# Patient Record
Sex: FEMALE | Race: WHITE | Employment: FULL TIME | URBAN - METROPOLITAN AREA
[De-identification: names, ages, dates, MRNs, and addresses within clinical notes are randomized per-mention and may not be internally consistent; named-entity substitution may affect disease eponyms.]

---

## 2017-11-22 PROBLEM — F98.8 ATTENTION DEFICIT DISORDER (ADD) WITHOUT HYPERACTIVITY: Status: ACTIVE | Noted: 2017-11-22

## 2017-11-22 PROBLEM — E55.9 VITAMIN D DEFICIENCY: Status: ACTIVE | Noted: 2017-11-22

## 2019-07-29 PROBLEM — F41.9 ANXIETY: Status: ACTIVE | Noted: 2019-07-29

## 2021-06-10 PROBLEM — Z82.49 FAMILY HISTORY OF PREMATURE CORONARY HEART DISEASE: Status: ACTIVE | Noted: 2021-06-10

## 2024-02-17 LAB — HIV RESULT OB: NEGATIVE

## 2024-04-10 ENCOUNTER — APPOINTMENT (OUTPATIENT)
Dept: OBGYN CLINIC | Facility: HOSPITAL | Age: 29
End: 2024-04-10
Payer: COMMERCIAL

## 2024-04-10 ENCOUNTER — HOSPITAL ENCOUNTER (OUTPATIENT)
Facility: HOSPITAL | Age: 29
Discharge: HOME | End: 2024-04-10
Attending: OBSTETRICS & GYNECOLOGY | Admitting: OBSTETRICS & GYNECOLOGY
Payer: COMMERCIAL

## 2024-04-10 VITALS
HEART RATE: 80 BPM | WEIGHT: 147 LBS | HEIGHT: 63.5 IN | DIASTOLIC BLOOD PRESSURE: 69 MMHG | SYSTOLIC BLOOD PRESSURE: 114 MMHG | BODY MASS INDEX: 25.72 KG/M2

## 2024-04-10 LAB
ANTIBODY SCREEN: NEGATIVE
BASOPHILS # BLD AUTO: 0.04 X10(3) UL (ref 0–0.2)
BASOPHILS NFR BLD AUTO: 0.4 %
BILIRUBIN URINE: NEGATIVE
BLOOD URINE: NEGATIVE
CONTROL RUN WITHIN 24 HOURS?: YES
EOSINOPHIL # BLD AUTO: 0.04 X10(3) UL (ref 0–0.7)
EOSINOPHIL NFR BLD AUTO: 0.4 %
ERYTHROCYTE [DISTWIDTH] IN BLOOD BY AUTOMATED COUNT: 12.1 %
GLUCOSE URINE: NEGATIVE
HCT VFR BLD AUTO: 39.8 %
HGB BLD-MCNC: 14 G/DL
IMM GRANULOCYTES # BLD AUTO: 0.03 X10(3) UL (ref 0–1)
IMM GRANULOCYTES NFR BLD: 0.3 %
LEUKOCYTE ESTERASE URINE: NEGATIVE
LYMPHOCYTES # BLD AUTO: 1.83 X10(3) UL (ref 1–4)
LYMPHOCYTES NFR BLD AUTO: 17.3 %
Lab: 40
MCH RBC QN AUTO: 32.6 PG (ref 26–34)
MCHC RBC AUTO-ENTMCNC: 35.2 G/DL (ref 31–37)
MCV RBC AUTO: 92.8 FL
MONOCYTES # BLD AUTO: 0.64 X10(3) UL (ref 0.1–1)
MONOCYTES NFR BLD AUTO: 6 %
NEUTROPHILS # BLD AUTO: 8.01 X10 (3) UL (ref 1.5–7.7)
NEUTROPHILS # BLD AUTO: 8.01 X10(3) UL (ref 1.5–7.7)
NEUTROPHILS NFR BLD AUTO: 75.6 %
NITRITE URINE: NEGATIVE
PH URINE: 6.5 (ref 5–8)
PLATELET # BLD AUTO: 197 10(3)UL (ref 150–450)
PROTEIN URINE: 30
RBC # BLD AUTO: 4.29 X10(6)UL
RH BLOOD TYPE: POSITIVE
SPEC GRAVITY: 1.02 (ref 1–1.03)
URINE CLARITY: CLEAR
URINE COLOR: YELLOW
UROBILINOGEN URINE: 0.2
WBC # BLD AUTO: 10.6 X10(3) UL (ref 4–11)

## 2024-04-10 PROCEDURE — 36415 COLL VENOUS BLD VENIPUNCTURE: CPT

## 2024-04-10 PROCEDURE — 86900 BLOOD TYPING SEROLOGIC ABO: CPT | Performed by: OBSTETRICS & GYNECOLOGY

## 2024-04-10 PROCEDURE — 86850 RBC ANTIBODY SCREEN: CPT | Performed by: OBSTETRICS & GYNECOLOGY

## 2024-04-10 PROCEDURE — 96372 THER/PROPH/DIAG INJ SC/IM: CPT

## 2024-04-10 PROCEDURE — 59025 FETAL NON-STRESS TEST: CPT

## 2024-04-10 PROCEDURE — 81002 URINALYSIS NONAUTO W/O SCOPE: CPT

## 2024-04-10 PROCEDURE — 59412 ANTEPARTUM MANIPULATION: CPT

## 2024-04-10 PROCEDURE — 86901 BLOOD TYPING SEROLOGIC RH(D): CPT | Performed by: OBSTETRICS & GYNECOLOGY

## 2024-04-10 PROCEDURE — 85025 COMPLETE CBC W/AUTO DIFF WBC: CPT | Performed by: OBSTETRICS & GYNECOLOGY

## 2024-04-10 RX ORDER — CHOLECALCIFEROL (VITAMIN D3) 25 MCG
1 TABLET,CHEWABLE ORAL DAILY
COMMUNITY

## 2024-04-10 RX ORDER — SODIUM CHLORIDE, SODIUM LACTATE, POTASSIUM CHLORIDE, CALCIUM CHLORIDE 600; 310; 30; 20 MG/100ML; MG/100ML; MG/100ML; MG/100ML
INJECTION, SOLUTION INTRAVENOUS CONTINUOUS
Status: DISCONTINUED | OUTPATIENT
Start: 2024-04-10 | End: 2024-04-10

## 2024-04-10 RX ORDER — TERBUTALINE SULFATE 1 MG/ML
0.25 INJECTION, SOLUTION SUBCUTANEOUS ONCE
Status: COMPLETED | OUTPATIENT
Start: 2024-04-10 | End: 2024-04-10

## 2024-04-10 NOTE — PROGRESS NOTES
Patient is a  @38.0 weeks who presents for a scheduled ECV.      Patient taken to room 104.  IV, labs, consent obtained.  POC discussed.  All patient questions answered.

## 2024-04-10 NOTE — PROCEDURES
A nonstress test was performed and found to be reactive. An ultrasound revealed that the baby was flower breech with the head in the maternal right upper quadrant. Informed consent was obtained. 0.25mg terbutaline was given subcutaneously. Gel was applied to her abdomen, and gentle pressure was applied to attempt to lift the breech out of the pelvis. The head was then gently maneuvered into the pelvis in a forward roll. Fetal heart tones were intermittently viewed during the procedure and were normal. The breech could not be elevated out of the pelvis despite multiple attempts. The procedure was deemed unsuccessful. She tolerated the procedure well. A nonstress test was again performed and was found to be reactive and reassuring for one hour. An abdominal binder was applied. There were no complications.

## 2024-04-10 NOTE — PROGRESS NOTES
Patient discharged in stable condition with printed discharge instructions in hand.  Instructions reviewed, all patient questions answered at this time.

## 2024-04-10 NOTE — DISCHARGE INSTRUCTIONS
section scheduled for 09:30am on .  Please plan to arrive at 07:30.  Nothing to eat or drink after midnight.

## 2024-04-10 NOTE — H&P
[facility]  History & Physical    Eda Armabula Patient Status:  Outpatient    1995 MRN [id number]   Location [facility] LABOR & DELIVERY Attending Ashley Valdez MD   Hosp Day # 0 PCP Solomon Anaya MD     SUBJECTIVE:    Eda Arambula is a 28 year old G at 38+0 weeks here for breech presentation for external cephalic version. Good FM, no VB, no LOF, feeling ctx.    Prenatal issues  1) ADHD no meds  2) HSV  3) Breech     Obstetric History:   OB History    Para Term  AB Living   2       1 0   SAB IAB Ectopic Multiple Live Births     1            # Outcome Date GA Lbr Jayson/2nd Weight Sex Type Anes PTL Lv   2 Current            1 IAB              Past Medical History:   Past Medical History:    ADHD (attention deficit hyperactivity disorder)    Herpes     Past Social History: History reviewed. No pertinent surgical history.  Family History:   Family History   Problem Relation Age of Onset    Heart Disease Father         acute MI    Heart Disease Paternal Grandmother     Heart Disease Paternal Grandfather     Heart Disorder Brother         acute MI    Obesity Brother         morbid obesity - 439#     Social History:   Social History     Tobacco Use    Smoking status: Never    Smokeless tobacco: Never   Substance Use Topics    Alcohol use: Yes     Comment: wine or beer weekends       Home Meds:   Medications Prior to Admission   Medication Sig Dispense Refill Last Dose    prenatal vitamin with DHA 27-0.8-228 MG Oral Cap Take 1 capsule by mouth daily.   2024    valACYclovir HCl 500 MG Oral Tab Take 1 tablet (500 mg total) by mouth as needed.   2024    BERNICE 0.25-35 MG-MCG Oral Tab TAKE 1 TABLET BY MOUTH EVERY DAY 84 tablet 0 Unknown    methylphenidate (METHYLIN) 10 MG Oral Tab Take 1 tablet (10 mg total) by mouth 2 (two) times daily. 60 tablet 0     Fluticasone Propionate 50 MCG/ACT Nasal Suspension 1 spray by Nasal route as needed. (Patient not taking: Reported on  6/10/2021)   Unknown     Allergies: No Known Allergies    OBJECTIVE:         Lungs:    Unlabored breathing, no wheezing   Heart:   regular rate and rhythm   Abdomen: Soft NT ND   Fetal Surveillance:  140 BPM   Mod paul, +accels, no decels      Cervix: no lesions or cervical motion tenderness and not digitally examined     Lab Review:  O, Rh+, Rubella-immune, Hepatitis B surface antigen non-reactive     ASSESSMENT:   at 38+0 weeks here for breech presentation for external cephalic version.    PLAN:  1) FHT reassuring  2) ECV: -It was reviewed with her that breech vaginal delivery is not recommended due to unacceptably high risk of fetal complications. The procedure for external cephalic version was reviewed. The success rate of about 50% was reviewed. The common complication of nonreactive fetal heart tones immediately post-procedure was reviewed. It was explained that serious complications like labor, placental abruption, SROM, and cord accident likely occur <6% of the time. Her questions were answered    Risks, benefits, alternatives and possible complications have been discussed in detail with the patient.  Pre-admission, admission, and post admission procedures and expectations were discussed in detail.  All questions answered, all appropriate consents will be signed at the Hospital. Admission is planned for today.   Continue present management..    Ashley Valdez MD  4/10/2024  11:35 AM

## 2024-04-11 ENCOUNTER — TELEPHONE (OUTPATIENT)
Dept: OBGYN UNIT | Facility: HOSPITAL | Age: 29
End: 2024-04-11

## 2024-04-17 ENCOUNTER — HOSPITAL ENCOUNTER (INPATIENT)
Facility: HOSPITAL | Age: 29
LOS: 3 days | Discharge: HOME | End: 2024-04-20
Attending: OBSTETRICS & GYNECOLOGY | Admitting: OBSTETRICS & GYNECOLOGY
Payer: COMMERCIAL

## 2024-04-17 ENCOUNTER — ANESTHESIA EVENT (OUTPATIENT)
Dept: OBGYN UNIT | Facility: HOSPITAL | Age: 29
End: 2024-04-17
Payer: COMMERCIAL

## 2024-04-17 ENCOUNTER — ANESTHESIA (OUTPATIENT)
Dept: OBGYN UNIT | Facility: HOSPITAL | Age: 29
End: 2024-04-17
Payer: COMMERCIAL

## 2024-04-17 DIAGNOSIS — Z98.891 H/O CESAREAN SECTION: Primary | ICD-10-CM

## 2024-04-17 PROBLEM — Z34.90 PREGNANCY (HCC): Status: ACTIVE | Noted: 2024-04-17

## 2024-04-17 LAB
ANTIBODY SCREEN: NEGATIVE
BASOPHILS # BLD AUTO: 0.06 X10(3) UL (ref 0–0.2)
BASOPHILS NFR BLD AUTO: 0.7 %
EOSINOPHIL # BLD AUTO: 0.07 X10(3) UL (ref 0–0.7)
EOSINOPHIL NFR BLD AUTO: 0.8 %
ERYTHROCYTE [DISTWIDTH] IN BLOOD BY AUTOMATED COUNT: 12.2 %
HCT VFR BLD AUTO: 39.5 %
HGB BLD-MCNC: 14.3 G/DL
IMM GRANULOCYTES # BLD AUTO: 0.08 X10(3) UL (ref 0–1)
IMM GRANULOCYTES NFR BLD: 0.9 %
LYMPHOCYTES # BLD AUTO: 1.65 X10(3) UL (ref 1–4)
LYMPHOCYTES NFR BLD AUTO: 19 %
MCH RBC QN AUTO: 33.3 PG (ref 26–34)
MCHC RBC AUTO-ENTMCNC: 36.2 G/DL (ref 31–37)
MCV RBC AUTO: 91.9 FL
MONOCYTES # BLD AUTO: 0.5 X10(3) UL (ref 0.1–1)
MONOCYTES NFR BLD AUTO: 5.8 %
NEUTROPHILS # BLD AUTO: 6.31 X10 (3) UL (ref 1.5–7.7)
NEUTROPHILS # BLD AUTO: 6.31 X10(3) UL (ref 1.5–7.7)
NEUTROPHILS NFR BLD AUTO: 72.8 %
PLATELET # BLD AUTO: 176 10(3)UL (ref 150–450)
RBC # BLD AUTO: 4.3 X10(6)UL
RH BLOOD TYPE: POSITIVE
T PALLIDUM AB SER QL IA: NONREACTIVE
WBC # BLD AUTO: 8.7 X10(3) UL (ref 4–11)

## 2024-04-17 PROCEDURE — 85025 COMPLETE CBC W/AUTO DIFF WBC: CPT | Performed by: OBSTETRICS & GYNECOLOGY

## 2024-04-17 PROCEDURE — 86850 RBC ANTIBODY SCREEN: CPT | Performed by: OBSTETRICS & GYNECOLOGY

## 2024-04-17 PROCEDURE — 86900 BLOOD TYPING SEROLOGIC ABO: CPT | Performed by: OBSTETRICS & GYNECOLOGY

## 2024-04-17 PROCEDURE — 86901 BLOOD TYPING SEROLOGIC RH(D): CPT | Performed by: OBSTETRICS & GYNECOLOGY

## 2024-04-17 PROCEDURE — 86780 TREPONEMA PALLIDUM: CPT | Performed by: OBSTETRICS & GYNECOLOGY

## 2024-04-17 RX ORDER — ONDANSETRON 2 MG/ML
4 INJECTION INTRAMUSCULAR; INTRAVENOUS ONCE AS NEEDED
Status: DISCONTINUED | OUTPATIENT
Start: 2024-04-17 | End: 2024-04-17 | Stop reason: HOSPADM

## 2024-04-17 RX ORDER — ONDANSETRON 2 MG/ML
4 INJECTION INTRAMUSCULAR; INTRAVENOUS EVERY 6 HOURS PRN
Status: DISCONTINUED | OUTPATIENT
Start: 2024-04-17 | End: 2024-04-20

## 2024-04-17 RX ORDER — CEFAZOLIN SODIUM/WATER 2 G/20 ML
2 SYRINGE (ML) INTRAVENOUS ONCE
Status: COMPLETED | OUTPATIENT
Start: 2024-04-17 | End: 2024-04-17

## 2024-04-17 RX ORDER — MORPHINE SULFATE 2 MG/ML
INJECTION, SOLUTION INTRAMUSCULAR; INTRAVENOUS AS NEEDED
Status: DISCONTINUED | OUTPATIENT
Start: 2024-04-17 | End: 2024-04-17 | Stop reason: SURG

## 2024-04-17 RX ORDER — PHENYLEPHRINE HCL 10 MG/ML
VIAL (ML) INJECTION AS NEEDED
Status: DISCONTINUED | OUTPATIENT
Start: 2024-04-17 | End: 2024-04-17 | Stop reason: SURG

## 2024-04-17 RX ORDER — SODIUM CHLORIDE, SODIUM LACTATE, POTASSIUM CHLORIDE, CALCIUM CHLORIDE 600; 310; 30; 20 MG/100ML; MG/100ML; MG/100ML; MG/100ML
INJECTION, SOLUTION INTRAVENOUS CONTINUOUS
Status: DISCONTINUED | OUTPATIENT
Start: 2024-04-17 | End: 2024-04-20

## 2024-04-17 RX ORDER — DIPHENHYDRAMINE HCL 25 MG
25 CAPSULE ORAL EVERY 4 HOURS PRN
Status: DISCONTINUED | OUTPATIENT
Start: 2024-04-17 | End: 2024-04-20

## 2024-04-17 RX ORDER — DIPHENHYDRAMINE HYDROCHLORIDE 50 MG/ML
12.5 INJECTION INTRAMUSCULAR; INTRAVENOUS EVERY 4 HOURS PRN
Status: DISCONTINUED | OUTPATIENT
Start: 2024-04-17 | End: 2024-04-20

## 2024-04-17 RX ORDER — DOCUSATE SODIUM 100 MG/1
100 CAPSULE, LIQUID FILLED ORAL
Status: DISCONTINUED | OUTPATIENT
Start: 2024-04-17 | End: 2024-04-20

## 2024-04-17 RX ORDER — EPHEDRINE SULFATE 50 MG/ML
INJECTION INTRAVENOUS AS NEEDED
Status: DISCONTINUED | OUTPATIENT
Start: 2024-04-17 | End: 2024-04-17 | Stop reason: SURG

## 2024-04-17 RX ORDER — BISACODYL 10 MG
10 SUPPOSITORY, RECTAL RECTAL ONCE AS NEEDED
Status: DISCONTINUED | OUTPATIENT
Start: 2024-04-17 | End: 2024-04-20

## 2024-04-17 RX ORDER — BUPIVACAINE HYDROCHLORIDE 7.5 MG/ML
INJECTION, SOLUTION INTRASPINAL AS NEEDED
Status: DISCONTINUED | OUTPATIENT
Start: 2024-04-17 | End: 2024-04-17 | Stop reason: SURG

## 2024-04-17 RX ORDER — IBUPROFEN 600 MG/1
600 TABLET ORAL EVERY 6 HOURS
Status: DISCONTINUED | OUTPATIENT
Start: 2024-04-18 | End: 2024-04-20

## 2024-04-17 RX ORDER — GABAPENTIN 300 MG/1
300 CAPSULE ORAL EVERY 8 HOURS PRN
Status: DISCONTINUED | OUTPATIENT
Start: 2024-04-17 | End: 2024-04-20

## 2024-04-17 RX ORDER — ACETAMINOPHEN 500 MG
1000 TABLET ORAL EVERY 6 HOURS
Status: DISCONTINUED | OUTPATIENT
Start: 2024-04-17 | End: 2024-04-20

## 2024-04-17 RX ORDER — SODIUM CHLORIDE, SODIUM LACTATE, POTASSIUM CHLORIDE, CALCIUM CHLORIDE 600; 310; 30; 20 MG/100ML; MG/100ML; MG/100ML; MG/100ML
125 INJECTION, SOLUTION INTRAVENOUS CONTINUOUS
Status: DISCONTINUED | OUTPATIENT
Start: 2024-04-17 | End: 2024-04-17 | Stop reason: HOSPADM

## 2024-04-17 RX ORDER — OXYCODONE HYDROCHLORIDE 5 MG/1
5 TABLET ORAL EVERY 6 HOURS PRN
Status: DISCONTINUED | OUTPATIENT
Start: 2024-04-17 | End: 2024-04-20

## 2024-04-17 RX ORDER — DEXAMETHASONE SODIUM PHOSPHATE 4 MG/ML
VIAL (ML) INJECTION AS NEEDED
Status: DISCONTINUED | OUTPATIENT
Start: 2024-04-17 | End: 2024-04-17 | Stop reason: SURG

## 2024-04-17 RX ORDER — KETOROLAC TROMETHAMINE 30 MG/ML
30 INJECTION, SOLUTION INTRAMUSCULAR; INTRAVENOUS ONCE
Status: COMPLETED | OUTPATIENT
Start: 2024-04-17 | End: 2024-04-17

## 2024-04-17 RX ORDER — HYDROMORPHONE HYDROCHLORIDE 1 MG/ML
0.3 INJECTION, SOLUTION INTRAMUSCULAR; INTRAVENOUS; SUBCUTANEOUS EVERY 2 HOUR PRN
Status: DISCONTINUED | OUTPATIENT
Start: 2024-04-17 | End: 2024-04-20

## 2024-04-17 RX ORDER — NALBUPHINE HYDROCHLORIDE 10 MG/ML
2.5 INJECTION, SOLUTION INTRAMUSCULAR; INTRAVENOUS; SUBCUTANEOUS
Status: DISCONTINUED | OUTPATIENT
Start: 2024-04-17 | End: 2024-04-17 | Stop reason: HOSPADM

## 2024-04-17 RX ORDER — KETOROLAC TROMETHAMINE 30 MG/ML
30 INJECTION, SOLUTION INTRAMUSCULAR; INTRAVENOUS EVERY 6 HOURS
Status: DISPENSED | OUTPATIENT
Start: 2024-04-17 | End: 2024-04-18

## 2024-04-17 RX ORDER — ACETAMINOPHEN 500 MG
1000 TABLET ORAL ONCE
Status: COMPLETED | OUTPATIENT
Start: 2024-04-17 | End: 2024-04-17

## 2024-04-17 RX ORDER — ONDANSETRON 2 MG/ML
4 INJECTION INTRAMUSCULAR; INTRAVENOUS EVERY 6 HOURS PRN
Status: DISCONTINUED | OUTPATIENT
Start: 2024-04-17 | End: 2024-04-17 | Stop reason: HOSPADM

## 2024-04-17 RX ORDER — NALBUPHINE HYDROCHLORIDE 10 MG/ML
2.5 INJECTION, SOLUTION INTRAMUSCULAR; INTRAVENOUS; SUBCUTANEOUS EVERY 4 HOURS PRN
Status: DISCONTINUED | OUTPATIENT
Start: 2024-04-17 | End: 2024-04-20

## 2024-04-17 RX ORDER — DEXTROSE, SODIUM CHLORIDE, SODIUM LACTATE, POTASSIUM CHLORIDE, AND CALCIUM CHLORIDE 5; .6; .31; .03; .02 G/100ML; G/100ML; G/100ML; G/100ML; G/100ML
INJECTION, SOLUTION INTRAVENOUS CONTINUOUS PRN
Status: DISCONTINUED | OUTPATIENT
Start: 2024-04-17 | End: 2024-04-20

## 2024-04-17 RX ORDER — CITRIC ACID/SODIUM CITRATE 334-500MG
30 SOLUTION, ORAL ORAL ONCE
Status: COMPLETED | OUTPATIENT
Start: 2024-04-17 | End: 2024-04-17

## 2024-04-17 RX ORDER — ONDANSETRON 2 MG/ML
INJECTION INTRAMUSCULAR; INTRAVENOUS AS NEEDED
Status: DISCONTINUED | OUTPATIENT
Start: 2024-04-17 | End: 2024-04-17 | Stop reason: SURG

## 2024-04-17 RX ORDER — NALOXONE HYDROCHLORIDE 0.4 MG/ML
0.08 INJECTION, SOLUTION INTRAMUSCULAR; INTRAVENOUS; SUBCUTANEOUS
Status: ACTIVE | OUTPATIENT
Start: 2024-04-17 | End: 2024-04-18

## 2024-04-17 RX ORDER — SIMETHICONE 80 MG
80 TABLET,CHEWABLE ORAL 3 TIMES DAILY PRN
Status: DISCONTINUED | OUTPATIENT
Start: 2024-04-17 | End: 2024-04-20

## 2024-04-17 RX ADMIN — DEXAMETHASONE SODIUM PHOSPHATE 4 MG: 4 MG/ML VIAL (ML) INJECTION at 09:50:00

## 2024-04-17 RX ADMIN — ONDANSETRON 4 MG: 2 INJECTION INTRAMUSCULAR; INTRAVENOUS at 09:50:00

## 2024-04-17 RX ADMIN — CEFAZOLIN SODIUM/WATER 2 G: 2 G/20 ML SYRINGE (ML) INTRAVENOUS at 09:43:00

## 2024-04-17 RX ADMIN — MORPHINE SULFATE 0.2 MG: 2 INJECTION, SOLUTION INTRAMUSCULAR; INTRAVENOUS at 09:35:00

## 2024-04-17 RX ADMIN — PHENYLEPHRINE HCL 50 MCG: 10 MG/ML VIAL (ML) INJECTION at 10:01:00

## 2024-04-17 RX ADMIN — PHENYLEPHRINE HCL 50 MCG: 10 MG/ML VIAL (ML) INJECTION at 09:43:00

## 2024-04-17 RX ADMIN — BUPIVACAINE HYDROCHLORIDE 1.6 ML: 7.5 INJECTION, SOLUTION INTRASPINAL at 09:35:00

## 2024-04-17 RX ADMIN — EPHEDRINE SULFATE 5 MG: 50 INJECTION INTRAVENOUS at 09:41:00

## 2024-04-17 NOTE — ANESTHESIA POSTPROCEDURE EVALUATION
[facility]    Eda Arambula Patient Status:  Inpatient   Age/Gender 28 year old female MRN [id number]   Location [facility] LABOR & DELIVERY Attending Ashley Valdez MD   Hosp Day # 0 PCP Solomon Anaya MD       Anesthesia Post-op Note     SECTION    Procedure Summary       Date: 24 Room / Location:  L+D OR   L+D OR    Anesthesia Start: 931 Anesthesia Stop:     Procedure:  SECTION Diagnosis: (same- delivered)    Surgeons: Ashley Valdez MD Anesthesiologist: Judson Hutton MD    Anesthesia Type: spinal ASA Status: 2            Anesthesia Type: spinal    Vitals Value Taken Time   /68 24 1024   Temp 98 24 1024   Pulse 74 24 1024   Resp 18 24 1024   SpO2 99 24 1024       Patient Location: Labor and Delivery    Anesthesia Type: spinal    Airway Patency: patent and extubated    Postop Pain Control: adequate    Mental Status: preanesthetic baseline    Nausea/Vomiting: none    Cardiopulmonary/Hydration status: stable euvolemic    Complications: no apparent anesthesia related complications    Postop vital signs: stable    Dental Exam: Unchanged from Preop    Patient to be discharged from PACU when criteria met.

## 2024-04-17 NOTE — H&P
[facility]  History & Physical    Eda Arambula Patient Status:  Inpatient    1995 MRN [id number]   Location [facility] LABOR & DELIVERY Attending Ashley Valdez MD   Hosp Day # 0 PCP Solomon Anaya MD     SUBJECTIVE:    Eda Arambula is a 28 year old  at 39+0 weeks here for primary  for breech. Good FM, no VB, no LOF, feeling ctx.    Prenatal issues:  1) Breech   2) HSV  3) ADHD no meds    Obstetric History:   OB History    Para Term  AB Living   2       1 0   SAB IAB Ectopic Multiple Live Births     1            # Outcome Date GA Lbr Jayson/2nd Weight Sex Type Anes PTL Lv   2 Current            1 IAB              Past Medical History:   Past Medical History:    ADHD (attention deficit hyperactivity disorder)    Herpes     Past Social History: History reviewed. No pertinent surgical history.  Family History:   Family History   Problem Relation Age of Onset    Heart Disease Father         acute MI    Heart Disease Paternal Grandmother     Heart Disease Paternal Grandfather     Heart Disorder Brother         acute MI    Obesity Brother         morbid obesity - 439#     Social History:   Social History     Tobacco Use    Smoking status: Never    Smokeless tobacco: Never   Substance Use Topics    Alcohol use: Yes     Comment: wine or beer weekends       Home Meds:   Medications Prior to Admission   Medication Sig Dispense Refill Last Dose    prenatal vitamin with DHA 27-0.8-228 MG Oral Cap Take 1 capsule by mouth daily.   2024 at     valACYclovir HCl 500 MG Oral Tab Take 1 tablet (500 mg total) by mouth as needed.   2024 at     BERNICE 0.25-35 MG-MCG Oral Tab TAKE 1 TABLET BY MOUTH EVERY DAY 84 tablet 0     methylphenidate (METHYLIN) 10 MG Oral Tab Take 1 tablet (10 mg total) by mouth 2 (two) times daily. 60 tablet 0     Fluticasone Propionate 50 MCG/ACT Nasal Suspension 1 spray by Nasal route as needed. (Patient not taking: Reported on 6/10/2021)         Allergies: No Known Allergies    OBJECTIVE:    Temp:  [97.8 °F (36.6 °C)] 97.8 °F (36.6 °C)  Pulse:  [80] 80  Resp:  [20] 20  BP: (115)/(64) 115/64    Lungs:    Unlabored breathing, no wheezing   Heart:   regular rate and rhythm   Abdomen: Soft NT ND   Fetal Surveillance:  140 BPM   Mod paul, +accels, no decels      Cervix: no lesions or cervical motion tenderness and not digitally examined     Lab Review:  O, Rh+, Rubella-immune, Hepatitis B surface antigen non-reactive, GBS negative     ASSESSMENT:   at 39+0 weeks here for scheduled primary  for breech.    PLAN:  1) FHT category I  2) Breech:  The risks of primary  were reviewed including risk of infection, bleeding, and damage to surrounding structures like bowel, bladder, tubes, and ovaries. She was advised that after one  she will have to discuss with her provider whether she should undergo a repeat  or a vaginal trial of labor. Questions were answered, and she gave informed consent.  3) Anesthesia saw patient, NPO, IV fluids, labs done    Risks, benefits, alternatives and possible complications have been discussed in detail with the patient.  Pre-admission, admission, and post admission procedures and expectations were discussed in detail.  All questions answered, all appropriate consents will be signed at the Hospital. Admission is planned for today.   Continue present management..    Ashley Valdez MD  2024  8:51 AM

## 2024-04-17 NOTE — PROGRESS NOTES
Pt is a 28 year old female admitted to TRG3/TRG3-A.     Chief Complaint   Patient presents with    Scheduled      breech      Pt is  39w0d intra-uterine pregnancy.  History obtained, consents signed. Oriented to room, staff, and plan of care.

## 2024-04-17 NOTE — PLAN OF CARE
Problem: Patient/Family Goals  Goal: Patient/Family Long Term Goal  Description: Patient's Long Term Goal:    Interventions:  -Uncomplicated  section    Interventions:    VS per protocol  EFM per protocol  I&O  Ahmadi catheter  Abd prep with clippers as needed  SCD to bilateral lower extremities  NPO  Insert/maintain IV access  Antibiotics per protocol  Informed consent  - See additional Care Plan goals for specific interventions  Outcome: Progressing  Goal: Patient/Family Short Term Goal  Description: Patient's Short Term Goal:     Interventions:   - Adequate pain control with delivery of infant  Interventions:  Pain assessment scores as ordered  Patient scores pain a \"3\" or less  Multidisciplinary care   Nonpharmacologic comfort measures    - See additional Care Plan goals for specific interventions  Outcome: Progressing     Problem: BIRTH - VAGINAL/ SECTION  Goal: Fetal and maternal status remain reassuring during the birth process  Description: INTERVENTIONS:  - Monitor vital signs  - Monitor fetal heart rate  - Monitor uterine activity  - Monitor labor progression (vaginal delivery)  - DVT prophylaxis (C/S delivery)  - Surgical antibiotic prophylaxis (C/S delivery)  Outcome: Progressing     Problem: PAIN - ADULT  Goal: Verbalizes/displays adequate comfort level or patient's stated pain goal  Description: INTERVENTIONS:  - Encourage pt to monitor pain and request assistance  - Assess pain using appropriate pain scale  - Administer analgesics based on type and severity of pain and evaluate response  - Implement non-pharmacological measures as appropriate and evaluate response  - Consider cultural and social influences on pain and pain management  - Manage/alleviate anxiety  - Utilize distraction and/or relaxation techniques  - Monitor for opioid side effects  - Notify MD/LIP if interventions unsuccessful or patient reports new pain  - Anticipate increased pain with activity and pre-medicate as  appropriate  Outcome: Progressing     Problem: ANXIETY  Goal: Will report anxiety at manageable levels  Description: INTERVENTIONS:  - Administer medication as ordered  - Teach and rehearse alternative coping skills  - Provide emotional support with 1:1 interaction with staff  Outcome: Progressing

## 2024-04-17 NOTE — ANESTHESIA PREPROCEDURE EVALUATION
PRE-OP EVALUATION    Patient Name: Eda Arambula    Admit Diagnosis: Pregnancy (HCC) [Z34.90]    Pre-op Diagnosis: * No pre-op diagnosis entered *     SECTION    Anesthesia Procedure:  SECTION    Surgeons and Role:     * Ashley Valdez MD - Primary    Pre-op vitals reviewed.  Temp: 97.8 °F (36.6 °C)  Pulse: 80  Resp: 20  BP: 115/64     Body mass index is 25.63 kg/m².    Current medications reviewed.  Hospital Medications:   [COMPLETED] lactated ringers IV bolus 1,000 mL  1,000 mL Intravenous Once    Followed by    lactated ringers infusion  125 mL/hr Intravenous Continuous    [COMPLETED] acetaminophen (Tylenol Extra Strength) tab 1,000 mg  1,000 mg Oral Once    ondansetron (Zofran) 4 MG/2ML injection 4 mg  4 mg Intravenous Q6H PRN    sodium citrate-citric acid (Bicitra) 500-334 MG/5ML oral solution 30 mL  30 mL Oral Once    oxyTOCIN in sodium chloride 0.9% (Pitocin) 30 Units/500mL infusion premix  62.5-900 ismael-units/min Intravenous Continuous    ceFAZolin (Ancef) 2 g in 20mL IV syringe premix  2 g Intravenous Once    [COMPLETED] terbutaline (Brethine) 1 MG/ML injection 0.25 mg  0.25 mg Subcutaneous Once       Outpatient Medications:     Medications Prior to Admission   Medication Sig Dispense Refill Last Dose    prenatal vitamin with DHA 27-0.8-228 MG Oral Cap Take 1 capsule by mouth daily.   2024 at     valACYclovir HCl 500 MG Oral Tab Take 1 tablet (500 mg total) by mouth as needed.   2024 at     BERNICE 0.25-35 MG-MCG Oral Tab TAKE 1 TABLET BY MOUTH EVERY DAY 84 tablet 0     methylphenidate (METHYLIN) 10 MG Oral Tab Take 1 tablet (10 mg total) by mouth 2 (two) times daily. 60 tablet 0     Fluticasone Propionate 50 MCG/ACT Nasal Suspension 1 spray by Nasal route as needed. (Patient not taking: Reported on 6/10/2021)          Allergies: Patient has no known allergies.      Anesthesia Evaluation    Patient summary reviewed.    Anesthetic Complications  (-) history of  anesthetic complications         GI/Hepatic/Renal    Negative GI/hepatic/renal ROS.                             Cardiovascular    Negative cardiovascular ROS.    Exercise tolerance: good     MET: >4                                           Endo/Other    Negative endo/other ROS.                              Pulmonary    Negative pulmonary ROS.                       Neuro/Psych    Negative neuro/psych ROS.                                  History reviewed. No pertinent surgical history.  Social History     Socioeconomic History    Marital status:    Occupational History    Occupation: teacher   Tobacco Use    Smoking status: Never    Smokeless tobacco: Never   Vaping Use    Vaping status: Never Used   Substance and Sexual Activity    Alcohol use: Yes     Comment: wine or beer weekends    Drug use: Yes     Types: Cannabis     Comment: has tried legal pot    Sexual activity: Never   Other Topics Concern     Service Yes    Blood Transfusions No    Caffeine Concern No    Occupational Exposure No    Hobby Hazards No    Sleep Concern No    Stress Concern No    Weight Concern No    Special Diet No    Back Care Yes    Exercise Yes    Seat Belt Yes     History   Drug Use    Types: Cannabis     Comment: has tried legal pot     Available pre-op labs reviewed.  Lab Results   Component Value Date    WBC 8.7 04/17/2024    RBC 4.30 04/17/2024    HGB 14.3 04/17/2024    HCT 39.5 04/17/2024    MCV 91.9 04/17/2024    MCH 33.3 04/17/2024    MCHC 36.2 04/17/2024    RDW 12.2 04/17/2024    .0 04/17/2024               Airway      Mallampati: II  Mouth opening: 3 FB  TM distance: 4 - 6 cm  Neck ROM: full Cardiovascular    Cardiovascular exam normal.  Rhythm: regular  Rate: normal  (-) murmur   Dental             Pulmonary    Pulmonary exam normal.  Breath sounds clear to auscultation bilaterally.               Other findings              ASA: 2   Plan: spinal  NPO status verified and patient meets  guidelines.    Post-procedure pain management plan discussed with surgeon and patient.    Comment: Risks and benefits of neuraxial anesthesia discussed with patient, including but not limited to bleeding, infection, and PDPH.  GA as backup anesthetic planned and discussed with patient.  Possible tooth/airway injury, complications with intubation and airway management discussed.   Plan/risks discussed with: patient                Present on Admission:  **None**

## 2024-04-17 NOTE — ANESTHESIA PROCEDURE NOTES
Spinal Block    Date/Time: 4/17/2024 9:35 AM    Performed by: Judson Hutton MD  Authorized by: Judson Hutton MD      General Information and Staff    Start Time:  4/17/2024 9:35 AM  End Time:  4/17/2024 9:35 AM  Anesthesiologist:  Judson Hutton MD  Performed by:  Anesthesiologist  Patient Location:  OB  Site identification: surface landmarks    Preanesthetic Checklist: patient identified, IV checked, risks and benefits discussed, monitors and equipment checked, pre-op evaluation, timeout performed, anesthesia consent and sterile technique used      Procedure Details    Patient Position:  Sitting  Prep: ChloraPrep    Monitoring:  Cardiac monitor, heart rate and continuous pulse ox  Approach:  Midline  Location:  L3-4  Injection Technique:  Single-shot    Needle    Needle Type:  Sprotte  Needle Gauge:  24 G  Needle Length:  3.5 in    Assessment    Sensory Level:  T6  Events: clear CSF, CSF aspirated, well tolerated and blood negative      Additional Comments

## 2024-04-17 NOTE — OPERATIVE REPORT
[facility]    Eda Arambula Patient Status:  Inpatient    1995 MRN [id number]   Location [facility] LABOR & DELIVERY Attending Ashley Valdez MD   Hosp Day # 0 PCP Solomon Anaya MD     Date of procedure: 24    Preoperative diagnosis:   1) Breech presentation    Postoperative diagnosis:  The same    Procedure: Primary Low Transverse  Delivery via Pfannensteil Skin Incision    Surgeon: Dr. Ashley Valdez  Assistant: Dr. Ulices Canseco    A skilled physician assistant was medically necessary for this procedure. The assistant provided retraction and visualization throughout the procedure and assisted with delivery of the infant.     Anesthesia: spinal  Fluids: 1000 mL LR  EBL: 325 mL  UOP: see MAR  Antibiotics: 2g ancef  DVT prophylaxis: SCDs    Findings:  1) Live born male infant, apgars 9/9, weight 6#14oz, flower breech  2) Otherwise normal appearing uterus, tubes, and ovaries    Complications: none    Specimens: none    Indication for procedure: This is a  at 39+0 weeks here for primary  for breech. The risks of the procedure were reviewed, and she gave informed consent.    Procedure. The patient was taken to the operating room and placed under spinal anesthesia. She was prepped and draped in supine position. Her anesthesia was tested and found to be adequate. A Pfannensteil incision was made with the scalpel and extended to the underlying fascia with the scalpel. The fascia was incised with the scalpel, and the incision was extended horizontally. The muscles were  in the midline bluntly, and the peritoneum was entered bluntly.    The bladder blade was inserted. A low transverse uterine incision was made with the scalpel and extended bluntly. The infant was delivered in breech extraction. The infant was handed to the neonatologist. Cord blood was collected. The placenta was delivered manually.    The uterus was exteriorized. The uterine incision was then  repaired with 0 vicryl in running fashion. It was hemostatic afterwards. The uterus was returned to the abdomen.    The gutters were then cleared of all clots. On repeat examination of the hysterotomy, good hemostasis was noted. The fascia was closed with 0 vicryl in running fashion. The subcutaneous tissue was irrigated and closed with 3-0 plain gut in interrupted fashion. The skin was closed with 4-0 monocryl in subcuticular fashion. Dressing was steristrips and a bandage.    She was then taken to the recovery room in good condition. She tolerated the procedure well.        Delivery Information for Wilfrid Arambula    Labor Events:     labor: No   Labor Onset date:     Labor Onset time:     Dil Complete date:     Dil Complete time:     Rupture date: 2024   Rupture time: 9:49 AM   Rupture type: AROM   Fluid Color: Clear   Induction:     Augmentation:     Complications:     Cervical ripening:                Anesthesia: Spinal       Delivery Location: OR       Delivery:    Episiotomy: None   Lacerations: None   Laceration repaired:     Blood loss (ml):       Birth information:  YOB: 2026   Time of birth: 9:49 AM   Sex: male   Delivery type: Caesarean Section   Gestational Age: 39w0d  Delivery Clinician:    Living Status: Living  [place] Disposition:  with mother          APGARS  One minute Five minutes Ten minutes   Skin color:         Heart rate:         Grimace:         Muscle tone:         Breathing:         Totals: 9  9        Presentation: Breech  Position:   Sacrum   Complete  Resuscitation:    Cord information:    Disposition of cord blood:     Blood gases sent?   Complications:    Placenta: Delivered:       appearance   Measurements:  Weight: 6 lb 14.4 oz (3.13 kg)  Length: 1' 7.5\" (0.495 m)  Head circumference: 13.19  Chest circumference: 12.80 Abdominal circumference:        Other providers:       Additional  information:  Forceps:    Vacuum:    Breech:    Observed anomalies

## 2024-04-18 LAB
BASOPHILS # BLD AUTO: 0.04 X10(3) UL (ref 0–0.2)
BASOPHILS NFR BLD AUTO: 0.3 %
EOSINOPHIL # BLD AUTO: 0.07 X10(3) UL (ref 0–0.7)
EOSINOPHIL NFR BLD AUTO: 0.5 %
ERYTHROCYTE [DISTWIDTH] IN BLOOD BY AUTOMATED COUNT: 12.5 %
HCT VFR BLD AUTO: 31.7 %
HGB BLD-MCNC: 11.3 G/DL
IMM GRANULOCYTES # BLD AUTO: 0.05 X10(3) UL (ref 0–1)
IMM GRANULOCYTES NFR BLD: 0.4 %
LYMPHOCYTES # BLD AUTO: 2.44 X10(3) UL (ref 1–4)
LYMPHOCYTES NFR BLD AUTO: 18.1 %
MCH RBC QN AUTO: 33.8 PG (ref 26–34)
MCHC RBC AUTO-ENTMCNC: 35.6 G/DL (ref 31–37)
MCV RBC AUTO: 94.9 FL
MONOCYTES # BLD AUTO: 0.73 X10(3) UL (ref 0.1–1)
MONOCYTES NFR BLD AUTO: 5.4 %
NEUTROPHILS # BLD AUTO: 10.14 X10 (3) UL (ref 1.5–7.7)
NEUTROPHILS # BLD AUTO: 10.14 X10(3) UL (ref 1.5–7.7)
NEUTROPHILS NFR BLD AUTO: 75.3 %
PLATELET # BLD AUTO: 159 10(3)UL (ref 150–450)
RBC # BLD AUTO: 3.34 X10(6)UL
WBC # BLD AUTO: 13.5 X10(3) UL (ref 4–11)

## 2024-04-18 PROCEDURE — 85025 COMPLETE CBC W/AUTO DIFF WBC: CPT | Performed by: OBSTETRICS & GYNECOLOGY

## 2024-04-18 NOTE — PROGRESS NOTES
OB Progress Note POD#1    S: She is feeling well. Minimal VB. Pain controlled. Breastfeeding. Voiding without difficulty, + flatus, no BM    O:  Blood pressure 98/70, pulse 55, temperature 98 °F (36.7 °C), temperature source Oral, resp. rate 12, weight 147 lb (66.7 kg), last menstrual period 07/11/2023, SpO2 97%, currently breastfeeding.    Intake/Output Summary (Last 24 hours) at 4/18/2024 1116  Last data filed at 4/18/2024 0400  Gross per 24 hour   Intake --   Output 2175 ml   Net -2175 ml       Gen: NAD, AAOx3  Breasts: soft, nontender, nonerythematous  Heart: RRR, no m/r/g  Lungs: CTA B/L  Abdomen: soft, minimally tender, nondistended, incision C/D/I with steri-strips  Gyne: minimal lochia  Ext: trace pitting edema    Lab Results   Component Value Date    WBC 13.5 04/18/2024    HGB 11.3 04/18/2024    HCT 31.7 04/18/2024    .0 04/18/2024       A/P: POD #1 s/p LTCS  1. Continue pain control with tylenol and motrin, oxy PRN   -- Toradol for first 24 hours then ibuprofen scheduled  2. Breastfeeding, given encouragement   -- Lactation consult PRN  3. Postpartum anemia   -- Hgb 11.3, asymptomatic   4. DVT ppx: continue ambulation  5. Fluids/elec: saline lock today and remove IV  6. Circumcision desired, will do    Continue inpatient observation    Kim Vivas D.O.  [facility]  Contact via [employer] or cell

## 2024-04-18 NOTE — PROGRESS NOTES
[facility] 1SW-J shay Juradojcarlos Arambula Patient Status:  Inpatient   Age/Gender 28 year old female MRN [id number]   Location [facility] 1SW-J Attending Ashley Valdez MD   Hosp Day # 1 PCP Solomon Anaya MD      Anesthesia Pain Progress Note    Anesthesia Technique:   Spinal Anesthesia     Pain Management Technique:  In addition to available oral supplemental and IV medications  Patient received neuraxial preservative free morphine for post procedural pain control.    Post Procedure Pain Quality:    Adequate    Pain Management Side Effects:  None     BP 98/70 (BP Location: Left arm)   Pulse 55   Temp 98 °F (36.7 °C) (Oral)   Resp 12   Wt 66.7 kg (147 lb)   LMP 2023   SpO2 97%   Breastfeeding Yes   BMI 25.63 kg/m²       Injection Site: Injection site is intact on inspection     Complications from Pain Management or Anesthesia:   None    All patient questions were answered.  Follow up pain management is separate from intraoperative anesthetic needs.  Pain care is transitioned to primary service, with management by oral medications.    Thank you for asking us to participate in the care of your patient.    Sandra Cunningham MD, 24, 8:04 AM

## 2024-04-19 RX ORDER — HYDROCODONE BITARTRATE AND ACETAMINOPHEN 5; 325 MG/1; MG/1
1 TABLET ORAL EVERY 6 HOURS PRN
Qty: 8 TABLET | Refills: 0 | Status: SHIPPED | OUTPATIENT
Start: 2024-04-19

## 2024-04-19 RX ORDER — IBUPROFEN 600 MG/1
600 TABLET ORAL EVERY 6 HOURS PRN
Qty: 25 TABLET | Refills: 0 | Status: SHIPPED | OUTPATIENT
Start: 2024-04-19

## 2024-04-19 RX ORDER — PSEUDOEPHEDRINE HCL 30 MG
100 TABLET ORAL DAILY
Qty: 30 CAPSULE | Refills: 0 | Status: SHIPPED | OUTPATIENT
Start: 2024-04-19 | End: 2024-05-19

## 2024-04-19 NOTE — DISCHARGE SUMMARY
[facility]   part of [facility]    Discharge Summary    Eda Arambula Patient Status:  Inpatient    1995 MRN [id number]   Location [facility] 1SW-J Attending Ashley Valdez MD   Hosp Day # 2 PCP Solomon Anaya MD     Date of Admission: 2024 Disposition: Home or Self Care     Date of Discharge: 2024    Admitting Diagnosis: Pregnancy (HCC) [Z34.90]    Discharge Diagnosis:   Patient Active Problem List   Diagnosis    Juvenile idiopathic scoliosis of thoracolumbar region    Attention deficit disorder (ADD) without hyperactivity    Vitamin D deficiency    Anxiety    Family history of premature coronary heart disease    Pregnancy (HCC)       Reason for Admission: scheduled  section    Physical Exam:   Vitals:    24 0729   BP: 108/71   Pulse: 67   Resp: 16   Temp: 98 °F (36.7 °C)       General: No acute distress  Pulm: nonlabored breathing  Cardiac: regular rate  Abd: soft, nontender, fundus firm below umbilicus, incision clean, dry, intact with steri strips  Ext: nontender lower extremities       History of Present Illness:   Eda Arambula is a 28 year old G at 38+0 weeks here for breech presentation for external cephalic version. Good FM, no VB, no LOF, feeling ctx.     Prenatal issues  1) ADHD no meds  2) HSV  3) Breech     Hospital Course: was admitted for scheduled csection. Intra-op and postpartum course uncomplicated. Throughout stay patient was meeting all milestones and found stable for discharge home.     Consultations: anesthesia    Procedures: PLTCS    Complications: none    Discharge Condition: Good         Discharge Medications:      Discharge Medications        START taking these medications        Instructions Prescription details   docusate sodium 100 MG Caps  Commonly known as: COLACE      Take 100 mg by mouth daily.   Stop taking on: May 19, 2024  Quantity: 30 capsule  Refills: 0     HYDROcodone-acetaminophen 5-325 MG Tabs  Commonly known  as: Norco      Take 1 tablet by mouth every 6 (six) hours as needed for Pain.   Quantity: 8 tablet  Refills: 0     ibuprofen 600 MG Tabs  Commonly known as: Motrin      Take 1 tablet (600 mg total) by mouth every 6 (six) hours as needed for Pain.   Quantity: 25 tablet  Refills: 0            CONTINUE taking these medications        Instructions Prescription details   prenatal vitamin with DHA 27-0.8-228 MG Caps      Take 1 capsule by mouth daily.   Refills: 0            STOP taking these medications      methylphenidate 10 MG Tabs  Commonly known as: Methylin                  Where to Get Your Medications        These medications were sent to [facility]/pharmacy #[id number] - [place], [place] - [address] [phone], [phone] [address] [facility] [address] [address]      Phone: [phone]   docusate sodium 100 MG Caps  HYDROcodone-acetaminophen 5-325 MG Tabs  ibuprofen 600 MG Tabs         Follow up Visits: Follow-up with Dr. Valdez in 6 week        Christina White MD  4/19/2024  10:17 AM

## 2024-04-19 NOTE — PROGRESS NOTES
OB Progress Note POD#2    S: She is feeling well. Minimal VB. Pain controlled. Breastfeeding. Voiding without difficulty, + flatus, no BM    O:  Blood pressure 108/71, pulse 67, temperature 98 °F (36.7 °C), temperature source Oral, resp. rate 16, weight 147 lb (66.7 kg), last menstrual period 07/11/2023, SpO2 97%, currently breastfeeding.  No intake or output data in the 24 hours ending 04/19/24 0959    Gen: NAD, AAOx3  Breasts: soft, nontender, nonerythematous  Heart: RRR, no m/r/g  Lungs: CTA B/L  Abdomen: soft, minimally tender, nondistended, incision C/D/I with steri strips  Gyne: minimal lochia  Ext: trace pitting edema       Recent Labs   Lab 04/17/24  0740 04/18/24  0724   RBC 4.30 3.34*   HGB 14.3 11.3*   HCT 39.5 31.7*   MCV 91.9 94.9   MCH 33.3 33.8   MCHC 36.2 35.6   RDW 12.2 12.5   NEPRELIM 6.31 10.14*   WBC 8.7 13.5*   .0 159.0           A/P: POD #2 s/p LTCS due to breech  1. Continue pain control with tylenol and motrin, oxycodone PRN  2. Breastfeeding, given encouragement   -- Lactation consult PRN  3. Postpartum anemia   -- Hgb 11.3, asymptomatic   4. DVT ppx: continue ambulation  5. Fluids/elec: saline lock today  6. Circumcision done yesterday      NM home tomorrow, instructions reviewed    Christina White MD  [facility]  Contact via [employer] or cell

## 2024-04-20 VITALS
BODY MASS INDEX: 26 KG/M2 | HEART RATE: 79 BPM | WEIGHT: 147 LBS | TEMPERATURE: 98 F | RESPIRATION RATE: 16 BRPM | OXYGEN SATURATION: 97 % | SYSTOLIC BLOOD PRESSURE: 116 MMHG | DIASTOLIC BLOOD PRESSURE: 82 MMHG

## 2024-04-20 NOTE — PROGRESS NOTES
DISCHARGE NOTE    Patient discharged to home. Discharge instructions were given. All questions answered.

## 2024-04-20 NOTE — PLAN OF CARE

## 2024-04-22 ENCOUNTER — TELEPHONE (OUTPATIENT)
Dept: OBGYN UNIT | Facility: HOSPITAL | Age: 29
End: 2024-04-22

## 2024-04-22 NOTE — PROGRESS NOTES
Reviewed self and infant care w / mom, she verbalizes understanding of instructions reviewed. Encourage to follow up w/ MDs as directed and w/ questions/concerns. Enc to join ct and get MY CHart.